# Patient Record
(demographics unavailable — no encounter records)

---

## 2025-07-02 NOTE — DATA REVIEWED
[de-identified] : Cervical spine 2 view radiographs were obtained and independently reviewed during today's visit. There are no signs of fracture, dislocation, bony injury noted. Disc spaces appear well preserved. No signs of prevertebral swelling.

## 2025-07-02 NOTE — ASSESSMENT
[FreeTextEntry1] : 8-year-old female with intermittent neck pain without signs of radiculopathy. Full neck motion preserved.  -We discussed the history, physical exam, and all available radiographs at length during today's visit with patient and her parent/guardian who served as an independent historian due to child's age and unreliable nature of history. -Cervical spine 2 view radiographs were obtained and independently reviewed during today's visit. There are no signs of fracture, dislocation, bony injury noted. Disc spaces appear well preserved. No signs of prevertebral swelling. -The etiology, pathoanatomy, treatment modalities, and expected natural history of neck pain were discussed at length today. -Clinically, she has mild intermittent left-sided neck pain.  She has retained full motion.  No palpable step-offs noted.  No palpable masses. -Based on negative radiographs and clinical examination recommendation at this time is to complete a course of physical therapy.  A prescription was provided today. -OTC NSAIDs as needed -She may continue with activity to tolerance. -We will plan to see her back in clinic in approximately 4 to 6 weeks for repeat clinical evaluation.  No radiographs unless clinically indicated.  We discussed the classic red flag signs and symptoms including bowel/bladder dysfunction, saddle anesthesia, upper/lower extremity weakness or numbness, changes in gait/balance, fevers, etc. which would require emergent reevaluation in the office or emergency department.   All questions and concerns were addressed today. Parent and patient verbalize understanding and agree with plan of care.   I, Haleigh Rodriguez, have acted as a scribe and documented the above information for Dr. Lai.   This note was created using Dragon Voice Recognition Software and may have been partially created using [employer] software which was then reviewed and edited to the best of my ability. Sporadic inaccurate translation may have occurred. If there are any questions about content of the note, please contact the office for clarification.

## 2025-07-02 NOTE — HISTORY OF PRESENT ILLNESS
[FreeTextEntry1] : Yue is an 8-year-old female with neck pain.  Per reports she has always had complaints of upper back and neck pain intermittently, associated with poor posture.  Approximately 1 month prior to evaluation her mother noted worsening pain.  No specific injury.  She also reported a tic-like motion when she is performing activities.  No concerns for pain while at rest.  Today, Yue continues to reports, intermittent left sided neck pain.  She has full neck range of motion.  She continues to deny any trauma to the neck.  She has required prior pain medication.  She has not required recent pain medication.  She denies any radiation of pain into her arms.  She presents today for initial evaluation of her neck pain.

## 2025-07-02 NOTE — END OF VISIT
[FreeTextEntry3] : IGeoffrey MD, personally saw and evaluated the patient and developed the plan as documented above. I concur or have edited the note as appropriate.

## 2025-07-02 NOTE — DATA REVIEWED
[de-identified] : Cervical spine 2 view radiographs were obtained and independently reviewed during today's visit. There are no signs of fracture, dislocation, bony injury noted. Disc spaces appear well preserved. No signs of prevertebral swelling.

## 2025-07-02 NOTE — PHYSICAL EXAM
[FreeTextEntry1] : GENERAL: alert, cooperative, in NAD SKIN: The skin is intact, warm, pink and dry over the area examined. EYES: Normal conjunctiva, normal eyelids and pupils were equal and round. ENT: normal ears, normal nose and normal lips. CARDIOVASCULAR: brisk capillary refill, but no peripheral edema. RESPIRATORY: The patient is in no apparent respiratory distress. They're taking full deep breaths without use of accessory muscles or evidence of audible wheezes or stridor without the use of a stethoscope. Normal respiratory effort. ABDOMEN: not examined.   Neck: No gross deformity.  No swelling.  No skin abrasions.  No ecchymoses.  Mild tenderness over the left lateral neck No midline tenderness No step-offs with palpation of cervical spine.  No palpable masses. There is full flexion extension of neck without discomfort.  Patient is able to bring jaw to anterior chest wall.  She is also able to look up at the ceiling.  The full neck rotation to right and left side, again with out discomfort.  There is no crepitus with neck range of motion.  Dougherty test is negative. 5/5 motor strength amongst all major upper and lower extremity muscle groups. 2+ patellar reflex. No clonus.  Gait: ABEBE ambulates with a normal and steady heel-to-toe gait without assistive devices. She bears equal weight across bilateral lower extremities. No evidence of a limp.

## 2025-07-02 NOTE — ASSESSMENT
[FreeTextEntry1] : 8-year-old female with intermittent neck pain without signs of radiculopathy. Full neck motion preserved.  -We discussed the history, physical exam, and all available radiographs at length during today's visit with patient and her parent/guardian who served as an independent historian due to child's age and unreliable nature of history. -Cervical spine 2 view radiographs were obtained and independently reviewed during today's visit. There are no signs of fracture, dislocation, bony injury noted. Disc spaces appear well preserved. No signs of prevertebral swelling. -The etiology, pathoanatomy, treatment modalities, and expected natural history of neck pain were discussed at length today. -Clinically, she has mild intermittent left-sided neck pain.  She has retained full motion.  No palpable step-offs noted.  No palpable masses. -Based on negative radiographs and clinical examination recommendation at this time is to complete a course of physical therapy.  A prescription was provided today. -OTC NSAIDs as needed -She may continue with activity to tolerance. -We will plan to see her back in clinic in approximately 4 to 6 weeks for repeat clinical evaluation.  No radiographs unless clinically indicated.  We discussed the classic red flag signs and symptoms including bowel/bladder dysfunction, saddle anesthesia, upper/lower extremity weakness or numbness, changes in gait/balance, fevers, etc. which would require emergent reevaluation in the office or emergency department.   All questions and concerns were addressed today. Parent and patient verbalize understanding and agree with plan of care.   I, Haleigh Rodriguez, have acted as a scribe and documented the above information for Dr. Lai.   This note was created using Dragon Voice Recognition Software and may have been partially created using [employer] software which was then reviewed and edited to the best of my ability. Sporadic inaccurate translation may have occurred. If there are any questions about content of the note, please contact the office for clarification.

## 2025-07-02 NOTE — REASON FOR VISIT
[Initial Evaluation] : an initial evaluation [Patient] : patient [Mother] : mother [FreeTextEntry1] : Neck pain

## 2025-07-02 NOTE — REVIEW OF SYSTEMS
[Change in Activity] : no change in activity [Fever Above 102] : no fever [Malaise] : no malaise [Rash] : no rash [Eye Pain] : no eye pain [Sore Throat] : no sore throat [Murmur] : no murmur [Congestion] : no congestion [Vomiting] : no vomiting [Diarrhea] : no diarrhea [Constipation] : no constipation [Bladder Infection] : denies bladder infection [Limping] : no limping [Joint Swelling] : no joint swelling [Sleep Disturbances] : ~T no sleep disturbances